# Patient Record
Sex: MALE | Race: AMERICAN INDIAN OR ALASKA NATIVE | NOT HISPANIC OR LATINO | URBAN - METROPOLITAN AREA
[De-identification: names, ages, dates, MRNs, and addresses within clinical notes are randomized per-mention and may not be internally consistent; named-entity substitution may affect disease eponyms.]

---
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
## 2025-04-15 NOTE — PATIENT PROFILE PEDIATRIC. - NS PRO DIET PRIOR TO ADMIT
[facility] called in asking for more information on the lab needed for PT so that the correct labs can be done.     What blood test needs to be done? orders state blood evaluation, but nothing like that is listed for tests.     Please contact her as soon as possible  
adult consistency food